# Patient Record
Sex: MALE | Race: WHITE | NOT HISPANIC OR LATINO | URBAN - METROPOLITAN AREA
[De-identification: names, ages, dates, MRNs, and addresses within clinical notes are randomized per-mention and may not be internally consistent; named-entity substitution may affect disease eponyms.]

---

## 2024-02-17 ENCOUNTER — APPOINTMENT (RX ONLY)
Dept: URBAN - METROPOLITAN AREA CLINIC 102 | Facility: CLINIC | Age: 59
Setting detail: DERMATOLOGY
End: 2024-02-17

## 2024-02-17 DIAGNOSIS — L82.1 OTHER SEBORRHEIC KERATOSIS: ICD-10-CM

## 2024-02-17 DIAGNOSIS — D22 MELANOCYTIC NEVI: ICD-10-CM

## 2024-02-17 DIAGNOSIS — B07.8 OTHER VIRAL WARTS: ICD-10-CM

## 2024-02-17 DIAGNOSIS — L81.4 OTHER MELANIN HYPERPIGMENTATION: ICD-10-CM

## 2024-02-17 DIAGNOSIS — D485 NEOPLASM OF UNCERTAIN BEHAVIOR OF SKIN: ICD-10-CM

## 2024-02-17 PROBLEM — D22.5 MELANOCYTIC NEVI OF TRUNK: Status: ACTIVE | Noted: 2024-02-17

## 2024-02-17 PROBLEM — D48.5 NEOPLASM OF UNCERTAIN BEHAVIOR OF SKIN: Status: ACTIVE | Noted: 2024-02-17

## 2024-02-17 PROCEDURE — ? BIOPSY BY SHAVE METHOD

## 2024-02-17 PROCEDURE — 99203 OFFICE O/P NEW LOW 30 MIN: CPT | Mod: 25

## 2024-02-17 PROCEDURE — 11102 TANGNTL BX SKIN SINGLE LES: CPT

## 2024-02-17 PROCEDURE — ? COUNSELING

## 2024-02-17 ASSESSMENT — LOCATION DETAILED DESCRIPTION DERM
LOCATION DETAILED: NASAL ROOT
LOCATION DETAILED: SUPERIOR THORACIC SPINE
LOCATION DETAILED: RIGHT MEDIAL UPPER BACK
LOCATION DETAILED: RIGHT KNEE
LOCATION DETAILED: RIGHT INFERIOR MEDIAL UPPER BACK

## 2024-02-17 ASSESSMENT — LOCATION ZONE DERM
LOCATION ZONE: LEG
LOCATION ZONE: NOSE
LOCATION ZONE: TRUNK

## 2024-02-17 ASSESSMENT — LOCATION SIMPLE DESCRIPTION DERM
LOCATION SIMPLE: NOSE
LOCATION SIMPLE: RIGHT KNEE
LOCATION SIMPLE: UPPER BACK
LOCATION SIMPLE: RIGHT UPPER BACK

## 2024-02-17 NOTE — PROCEDURE: BIOPSY BY SHAVE METHOD
Body Location Override (Optional - Billing Will Still Be Based On Selected Body Map Location If Applicable): right nasal root
Detail Level: Detailed
Depth Of Biopsy: dermis
Was A Bandage Applied: Yes
Size Of Lesion In Cm: 1.5
X Size Of Lesion In Cm: 0
Biopsy Type: H and E
Biopsy Method: Dermablade
Anesthesia Type: 1% lidocaine with epinephrine
Anesthesia Volume In Cc: 0.5
Hemostasis: Drysol
Wound Care: Aquaphor
Dressing: bandage
Destruction After The Procedure: No
Type Of Destruction Used: Curettage
Curettage Text: The wound bed was treated with curettage after the biopsy was performed.
Cryotherapy Text: The wound bed was treated with cryotherapy after the biopsy was performed.
Electrodesiccation Text: The wound bed was treated with electrodesiccation after the biopsy was performed.
Electrodesiccation And Curettage Text: The wound bed was treated with electrodesiccation and curettage after the biopsy was performed.
Silver Nitrate Text: The wound bed was treated with silver nitrate after the biopsy was performed.
Lab: -[id number]
Consent: Written consent was obtained and risks were reviewed including but not limited to scarring, infection, bleeding, scabbing, incomplete removal, nerve damage and allergy to anesthesia.
Post-Care Instructions: I reviewed with the patient in detail post-care instructions. Patient is to keep the biopsy site dry overnight, and then apply bacitracin twice daily until healed. Patient may apply hydrogen peroxide soaks to remove any crusting.
Notification Instructions: Patient will be notified of biopsy results. However, patient instructed to call the office if not contacted within 2 weeks.
Billing Type: Third-Party Bill
Information: Selecting Yes will display possible errors in your note based on the variables you have selected. This validation is only offered as a suggestion for you. PLEASE NOTE THAT THE VALIDATION TEXT WILL BE REMOVED WHEN YOU FINALIZE YOUR NOTE. IF YOU WANT TO FAX A PRELIMINARY NOTE YOU WILL NEED TO TOGGLE THIS TO 'NO' IF YOU DO NOT WANT IT IN YOUR FAXED NOTE.

## 2024-05-15 ENCOUNTER — APPOINTMENT (RX ONLY)
Dept: URBAN - METROPOLITAN AREA CLINIC 95 | Facility: CLINIC | Age: 59
Setting detail: DERMATOLOGY
End: 2024-05-15

## 2024-05-15 PROCEDURE — ? PATIENT SPECIFIC COUNSELING

## 2024-05-15 PROCEDURE — ? CONSULTATION FOR ELECTRON BEAM THERAPY

## 2024-05-15 NOTE — PROCEDURE: PATIENT SPECIFIC COUNSELING
The patient was educated about the following:\\nIt is normal to have skin reactions during radiation therapy treatment. \\nPatients may develop redness at the treatment site similar to a sunburn.\\nTopical creams are provided that will help reduce side effects and limit irritation.
Detail Level: Zone
Under 60 plan:\\nWe explained that some guidelines NCCN recommend that radiation be reserved for patients over the age of 60 years old based on potential risks years down the road, however, this is a relative contraindication and not an absolute contraindication. One must weigh the potential risks vs benefits of surgical outcomes vs. alternative treatment. Patient understands the short term vs. chronic (long term) side effects including nasal bleeding, hypopigmentation (skin lightening), scarring, telangiectasias (superficial blood vessels), cataracts, and in very rare cases secondary cancers that could develop years or decades later. The patient understands that typically, due to a relatively young age and the location of the cancer, and the hopes to optimize cosmesis, we would recommend a slower, more protracted treatment regimen (which has been associated with less potential side effects).  Patient has opted to be treated by a protracted treatment regimen. The patient had the opportunity to ask questions which appear to have been answered to his or her satisfaction with an expressed understanding regarding all of the above.  We will schedule the patient  for initial complex simulations today.  Thank you for allowing us to participate in this patient’s care.

## 2024-05-15 NOTE — PROCEDURE: CONSULTATION FOR ELECTRON BEAM THERAPY
Abdomen: No
Body Location Override (Optional - Billing Will Still Be Based On Selected Body Map Location If Applicable): right nasal root
Referring Provider: HEMANT Doan
X Size Of Lesion In Cm (Optional): 0
Other Plan: We discussed a 4 to 6 week treatment plan
Detail Level: Detailed

## 2024-05-15 NOTE — HPI: BIOPSY PROVEN BCC
How Severe Is Your Bcc?: moderate
When Was The Bcc Biopsied? (Optional): 02/19/2024
Accession # (Optional): [id number]

## 2024-09-19 ENCOUNTER — APPOINTMENT (RX ONLY)
Dept: URBAN - METROPOLITAN AREA CLINIC 102 | Facility: CLINIC | Age: 59
Setting detail: DERMATOLOGY
End: 2024-09-19

## 2024-09-19 PROBLEM — C44.311 BASAL CELL CARCINOMA OF SKIN OF NOSE: Status: ACTIVE | Noted: 2024-09-19

## 2024-09-19 PROCEDURE — ? CONSULTATION FOR ELECTRON BEAM THERAPY

## 2024-09-19 PROCEDURE — 99214 OFFICE O/P EST MOD 30 MIN: CPT

## 2024-09-19 PROCEDURE — ? PATIENT SPECIFIC COUNSELING

## 2024-09-19 NOTE — PROCEDURE: CONSULTATION FOR ELECTRON BEAM THERAPY
Ears, Nose, Mouth, Throat: No
Detail Level: Detailed
X Size Of Lesion In Cm (Optional): 1.7
Size Of Lesion: 1.2
Referring Provider: Nanci MARCOS FNP-BC
Other Plan: We discussed a 5 to 6 week treatment plan. The patient selected a 5 week regimen to conclude treatment more quickly

## 2024-09-19 NOTE — HPI: BIOPSY PROVEN BCC
How Severe Is Your Bcc?: severe
When Was The Bcc Biopsied? (Optional): on 2/19/24
Accession # (Optional): [id number]

## 2024-09-19 NOTE — PROCEDURE: PATIENT SPECIFIC COUNSELING
The patient was educated about the following:\\nIt is normal to have skin reactions during radiation therapy treatment. \\nPatients may develop redness at the treatment site similar to a sunburn.\\nTopical creams are provided that will help reduce side effects and limit irritation.
Detail Level: Zone
Under 60 plan:\\n One must weigh the potential risks vs benefits of surgical outcomes vs. alternative treatment. Patient understands the short term vs. chronic (long term) side effects including nasal bleeding, hypopigmentation (skin lightening), scarring, telangiectasias (superficial blood vessels), cataracts, and in very rare cases secondary cancers that could develop years or decades later. The patient understands that typically, due to a relatively young age and the location of the cancer, and the hopes to optimize cosmesis, we would recommend a slower, more protracted treatment regimen (which has been associated with less potential side effects).  Patient has opted to be treated by a protracted treatment regimen. The patient had the opportunity to ask questions which appear to have been answered to his  satisfaction with an expressed understanding regarding all of the above.  We will schedule the patient  for initial complex simulations today.  Thank you for allowing us to participate in this patient’s care.

## 2024-09-23 ENCOUNTER — APPOINTMENT (RX ONLY)
Dept: URBAN - METROPOLITAN AREA CLINIC 102 | Facility: CLINIC | Age: 59
Setting detail: DERMATOLOGY
End: 2024-09-23

## 2024-09-23 PROCEDURE — ? INITIAL COMPLEX SIMULATION

## 2024-09-23 PROCEDURE — ? CLINICAL TREATMENT PLAN FOR RADIATION THERAPY

## 2024-09-23 NOTE — PROCEDURE: INITIAL COMPLEX SIMULATION
Bolus Thickness In Cm: 0.5
Treatment Length (Include Units): 5 weeks
Send Clinical Treatment Planning Code To Pm?: No - Documentation Only
Intro Statement (Will Not Render If Left Blank): I then designed a radiation field to include the gross lesion (GTV) with additional margin that accounted for both potential subclinical microscopic extension (CTV), as well as for the beam characteristics of superficial electrons (PTV). Care was taken in designing the field near adjacent normal tissue structures. The target volume was drawn on the skin surface with a permanent marker and then the design with reference marks was carefully traced onto an acetate template. Digital photographs were taken.
Acetate Template Statement (Will Not Render If Left Blank): An acetate template will be used to fabricate a custom lead shielding device to allow for lead on skin collimation. This type of shielding will best limit beam penumbra. Additional shielding will also be added to protect adjacent strutures.
Dosing Schedule: 5 days a week
Use Custom Eyeshields: Yes
Energy In Mev: 6
Detail Level: Detailed
Position: supine
Eye Shield Statement (Will Not Render If Left Blank): External eye shields will be placed daily to limit scatter dose to the lenses of the eyes. Customized eye shielding is safer than reusable eye shielding.  We therefore will custom design and fabricate bilateral eye shields made of shaped lead and covered by wax, for each of our patients. These will be used only during the course of treatment and then destroyed and constitute a complex fabrication process and device.
Intro Statement For Treatment Plan (Will Not Render If Left Blank): Records, reports, pathology, and physical exam have been completed, interpreted and reviewed to assist in defining the course of radiation therapy treatment. Parameters interpreted include tumor histology, size, location, extent of disease and prior medical history. These factors will integrate into radiation field design. A complex electron beam simulation is necessary to accomplish a reproducible beam arrangement, field size and target volume with which to treat the tumor. Beam modifying devices will be designed and utilized as necessary to optimize the treatment and to best spare normal tissues. Complex blocking will be performed to minimize radiation scatter (penumbra), shape to target volume, and to best protect adjacent and underlying normal tissues. Fields will be verified on the patient prior to radiation delivery.
Pathology: Use Selected EMA Impression
Isodose: 90
Cummulative Dose (Include Units): 5500cGy
Closing Statement (Will Not Render If Left Blank): The patient tolerated the simulation well and appears to have had all of their questions answered to their satisfaction. The patient will return for field verification, simple simulation before radiation is delivered to confirm patient positioning and block shaping and positioning.
Custom Bolus Type: custom mixed, molded, and shaped
Daily Dose (Include Units): 250cGy

## 2024-09-23 NOTE — PROCEDURE: CLINICAL TREATMENT PLAN FOR RADIATION THERAPY
Pathology: Use Selected EMA Impression
Send Cpt Codes To Pm?: Yes
Detail Level: Detailed
Cummulative Dose (Include Units): 5500cGy
Treatment Length (Include Units): 5 weeks
Position: supine
Dosing Schedule: 5 days a week
Energy In Mev: 6
Which Code: 24477
Daily Dose (Include Units): 250cGy
Intro Statement (Will Not Render If Left Blank): Records, reports, pathology, and physical exam have been completed, interpreted and reviewed to assist in defining the course of radiation therapy treatment. Parameters interpreted include tumor histology, size, location, extent of disease and prior medical history. These factors will integrate into radiation field design. A complex electron beam simulation is necessary to accomplish a reproducible beam arrangement, field size and target volume with which to treat the tumor. Beam modifying devices will be designed and utilized as necessary to optimize the treatment and to best spare normal tissues. Complex blocking will be performed to minimize radiation scatter (penumbra), shape to target volume, and to best protect adjacent and underlying normal tissues. Fields will be verified on the patient prior to radiation delivery.